# Patient Record
Sex: FEMALE
[De-identification: names, ages, dates, MRNs, and addresses within clinical notes are randomized per-mention and may not be internally consistent; named-entity substitution may affect disease eponyms.]

---

## 2021-06-04 ENCOUNTER — TRANSCRIPTION ENCOUNTER (OUTPATIENT)
Age: 71
End: 2021-06-04

## 2024-07-17 ENCOUNTER — APPOINTMENT (OUTPATIENT)
Dept: NEUROLOGY | Facility: CLINIC | Age: 74
End: 2024-07-17

## 2024-07-25 PROBLEM — Z00.00 ENCOUNTER FOR PREVENTIVE HEALTH EXAMINATION: Status: ACTIVE | Noted: 2024-07-25

## 2024-08-06 ENCOUNTER — APPOINTMENT (OUTPATIENT)
Dept: NEUROLOGY | Facility: CLINIC | Age: 74
End: 2024-08-06

## 2024-08-06 PROBLEM — E78.5 HLD (HYPERLIPIDEMIA): Status: ACTIVE | Noted: 2024-08-06

## 2024-08-06 PROBLEM — I10 HTN (HYPERTENSION): Status: ACTIVE | Noted: 2024-08-06

## 2024-08-06 PROBLEM — F41.9 ANXIETY AND DEPRESSION: Status: ACTIVE | Noted: 2024-08-06

## 2024-08-06 PROCEDURE — G2211 COMPLEX E/M VISIT ADD ON: CPT

## 2024-08-06 PROCEDURE — 99205 OFFICE O/P NEW HI 60 MIN: CPT

## 2024-08-06 NOTE — PHYSICAL EXAM
[General Appearance - Alert] : alert [Oriented To Time, Place, And Person] : oriented to person, place, and time [Affect] : the affect was normal [Person] : oriented to person [Place] : oriented to place [Time] : oriented to time [Date / Time ___ / 5] : date / time [unfilled] / 5 [Place ___ / 5] : place [unfilled] / 5 [Registration ___ / 3] : registration [unfilled] / 3 [Serial Sevens ___/5] : serial sevens [unfilled] / 5 [Naming 2 Objects ___ / 2] : naming two objects [unfilled] / 2 [Repeating a Sentence ___ / 1] : repeating a sentence [unfilled] / 1 [3-stage Verbal Command ___ / 3] : three-stage verbal command [unfilled] / 3 [Cranial Nerves Optic (II)] : visual acuity intact bilaterally,  visual fields full to confrontation, pupils equal round and reactive to light [Cranial Nerves Oculomotor (III)] : extraocular motion intact [Cranial Nerves Trigeminal (V)] : facial sensation intact symmetrically [Cranial Nerves Facial (VII)] : face symmetrical [Cranial Nerves Vestibulocochlear (VIII)] : hearing was intact bilaterally [Cranial Nerves Glossopharyngeal (IX)] : tongue and palate midline [Cranial Nerves Accessory (XI - Cranial And Spinal)] : head turning and shoulder shrug symmetric [Cranial Nerves Hypoglossal (XII)] : there was no tongue deviation with protrusion [Remote Intact] : remote memory intact [Registration Intact] : recent registration memory intact [Span Intact] : the attention span was normal [Visual Intact] : visual attention was ~T not ~L decreased [Naming Objects] : no difficulty naming common objects [Repeating Phrases] : no difficulty repeating a phrase [Writing A Sentence] : no difficulty writing a sentence [Fluency] : fluency intact [Comprehension] : comprehension intact [Reading] : reading intact [Past History] : adequate knowledge of personal past history [Vocabulary] : adequate range of vocabulary [Total Score ___ / 30] : the patient achieved a score of [unfilled] /30 [Writing a Sentence ___ / 1] : write sentence [unfilled] / 1 [Written Command ___ / 1] : written command [unfilled] / 1 [Copy a Design ___ / 1] : copy a design [unfilled] / 1 [Recall ___ / 3] : recall [unfilled] / 3 [Motor Tone] : muscle tone was normal in all four extremities [Motor Strength] : muscle strength was normal in all four extremities [Abnormal Walk] : normal gait [2+] : Ankle jerk left 2+ [Sclera] : the sclera and conjunctiva were normal [PERRL With Normal Accommodation] : pupils were equal in size, round, reactive to light, with normal accommodation [Extraocular Movements] : extraocular movements were intact [Full Visual Field] : full visual field [Short Term Intact] : short term memory impaired [Current Events] : inadequate knowledge of current events [Romberg's Sign] : Romberg's sign was negtive [Limited Balance] : balance was intact [Tremor] : no tremor present [FreeTextEntry4] : Mental Status Exam   Presidents: 2/5 Alternating Pattern: ok  Spiral: ok Clock: 2/3 Repetition: ok Trail A: B:  Fluency: A: 8 Animals:   Go-No-Go: ok   R/L discrimination on self and examiner: ok Cross-line commands: ok Praxis: ok - Motor: ok -Dynamic/Luria: ok  -Ideomotor/Imitation: ok   -Ideational/writing/closing-in: ok  -Dressing: ok

## 2024-08-06 NOTE — REVIEW OF SYSTEMS
[Confused or Disoriented] : confusion [Memory Lapses or Loss] : memory loss [Repeating Questions] : repeated questioning about recent events [Dizziness] : dizziness [Migraine Headache] : migraine headaches [As Noted in HPI] : as noted in HPI [Earache] : earache [Negative] : Heme/Lymph [Decr. Concentrating Ability] : no decrease in concentrating ability [Difficulty with Language] : no ~M difficulty with language [Changed Thought Patterns] : no change in thought patterns [Facial Weakness] : no facial weakness [Arm Weakness] : no arm weakness [Hand Weakness] : no hand weakness [Leg Weakness] : no leg weakness [Poor Coordination] : good coordination [Difficulty Writing] : no difficulty writing [Difficulties in Speech] : no speech difficulties [Numbness] : no numbness [Tingling] : no tingling [Abnormal Sensation] : no abnormal sensation [Hypersensitivity] : no hypersensitivity [Seizures] : no convulsions [Fainting] : no fainting [Lightheadedness] : no lightheadedness [Vertigo] : no vertigo [Cluster Headache] : no cluster headache [Tension Headache] : no tension-type headache [Difficulty Walking] : no difficulty walking [Inability to Walk] : able to walk [Ataxia] : no ataxia [Frequent Falls] : not falling [Limping] : not limping

## 2024-08-06 NOTE — HISTORY OF PRESENT ILLNESS
[FreeTextEntry1] : COVID , took Paxlovid and no hospitalization  COVID VACCINE FULL.  HPI: 73-year-old female presents today for initial cognitive evaluation with her . Memory loss x 3 years that has been prominent in the last year. Her  had colon cancer and needed valve replaced because cancer spread to heart in Spring of 2023. A few months ago, she was hospitalized. She had a colonoscopy and was bleeding 2 days later. No hallucinations or delusions. She is not socializing as much due to her 's condition. No changes in personality or behavior. She has an adult son who has two kids. No known family history of Dementia.    PMH: HTN, HLD, Anxiety and Depression     -Memory: STM loss   -Speech: word finding difficulty  -Orientation: disoriented at times  -Praxis: ok  -Decision making/Executive fx/Multitasking: good  -Sleep: good   -Appetite: good  -Motor symptoms:  None   -B/B: sometimes (prior to memory issues)     -Psychiatric symptoms: Anxiety and Depression     -Functional status:   [place] Index of [place] in Activities of Daily Livin. Bathing/Showerin  2. Dressin  3. Toiletin   4. Transferrin 5. Continence:  1 6: Feedin  TOTAL:  6     [place] Instrumental Activities of Daily Living:  A. Ability to Use Telephone:1   B. Shoppin C. Food Preparation:1    D. Housekeepin   E. Laundry:  1 F. Transportation: 1    G. Responsibility for Own Medications: 1  H: Ability to Handle Finances:  1 TOTAL: 8   CDR: 0  -Professional status:  office work    PCP and other physicians:  -PCP: Dr. Ivelisse Cheung  Workup done: None

## 2024-08-06 NOTE — ASSESSMENT
[FreeTextEntry1] : Assessment: 73-year-old female with pmh htn, hld, anxiety. Independent in adls and iadls. MMSE 25/30 with poor recall. Visual spatial skills intact. Able to write a sentence. [place] a clock with incorrect time.     Diagnostic Impression:     -Anxiety and Depression -memory loss -stress at home   Plan: -Rule out reversible vs vascular causes -MR 3D dementia   -B vitamins, TFT, RPR -lyme serology  -npt -WIll monitor hr and see if ok to begin Donepezil 5 mg

## 2025-02-19 ENCOUNTER — APPOINTMENT (OUTPATIENT)
Dept: NEUROLOGY | Facility: CLINIC | Age: 75
End: 2025-02-19
Payer: MEDICARE

## 2025-02-19 VITALS
BODY MASS INDEX: 27.1 KG/M2 | DIASTOLIC BLOOD PRESSURE: 92 MMHG | SYSTOLIC BLOOD PRESSURE: 182 MMHG | WEIGHT: 153 LBS | HEART RATE: 69 BPM

## 2025-02-19 DIAGNOSIS — F43.9 REACTION TO SEVERE STRESS, UNSPECIFIED: ICD-10-CM

## 2025-02-19 DIAGNOSIS — R41.3 OTHER AMNESIA: ICD-10-CM

## 2025-02-19 PROCEDURE — G2211 COMPLEX E/M VISIT ADD ON: CPT

## 2025-02-19 PROCEDURE — 99214 OFFICE O/P EST MOD 30 MIN: CPT

## 2025-02-19 RX ORDER — EMPAGLIFLOZIN 25 MG/1
25 TABLET, FILM COATED ORAL DAILY
Refills: 0 | Status: ACTIVE | COMMUNITY
Start: 2025-02-19

## 2025-02-28 ENCOUNTER — APPOINTMENT (OUTPATIENT)
Dept: NEUROLOGY | Facility: CLINIC | Age: 75
End: 2025-02-28
Payer: MEDICARE

## 2025-02-28 PROCEDURE — 96139 PSYCL/NRPSYC TST TECH EA: CPT | Mod: NC

## 2025-02-28 PROCEDURE — 96133 NRPSYC TST EVAL PHYS/QHP EA: CPT

## 2025-02-28 PROCEDURE — 96138 PSYCL/NRPSYC TECH 1ST: CPT | Mod: NC

## 2025-02-28 PROCEDURE — 96132 NRPSYC TST EVAL PHYS/QHP 1ST: CPT

## 2025-02-28 PROCEDURE — 96116 NUBHVL XM PHYS/QHP 1ST HR: CPT

## 2025-03-28 ENCOUNTER — APPOINTMENT (OUTPATIENT)
Dept: NEUROLOGY | Facility: CLINIC | Age: 75
End: 2025-03-28

## 2025-03-28 ENCOUNTER — NON-APPOINTMENT (OUTPATIENT)
Age: 75
End: 2025-03-28

## 2025-03-28 PROCEDURE — 96133 NRPSYC TST EVAL PHYS/QHP EA: CPT | Mod: 2W
